# Patient Record
Sex: MALE | Race: OTHER | HISPANIC OR LATINO
[De-identification: names, ages, dates, MRNs, and addresses within clinical notes are randomized per-mention and may not be internally consistent; named-entity substitution may affect disease eponyms.]

---

## 2018-02-13 PROBLEM — Z00.00 ENCOUNTER FOR PREVENTIVE HEALTH EXAMINATION: Status: ACTIVE | Noted: 2018-02-13

## 2018-02-26 ENCOUNTER — RECORD ABSTRACTING (OUTPATIENT)
Age: 63
End: 2018-02-26

## 2018-02-26 DIAGNOSIS — Z82.49 FAMILY HISTORY OF ISCHEMIC HEART DISEASE AND OTHER DISEASES OF THE CIRCULATORY SYSTEM: ICD-10-CM

## 2018-02-26 DIAGNOSIS — E78.2 MIXED HYPERLIPIDEMIA: ICD-10-CM

## 2018-02-26 DIAGNOSIS — J00 ACUTE NASOPHARYNGITIS [COMMON COLD]: ICD-10-CM

## 2018-02-26 DIAGNOSIS — R05 COUGH: ICD-10-CM

## 2018-02-26 DIAGNOSIS — Z87.81 PERSONAL HISTORY OF (HEALED) TRAUMATIC FRACTURE: ICD-10-CM

## 2018-02-26 DIAGNOSIS — S02.42XD: ICD-10-CM

## 2018-02-26 DIAGNOSIS — Z78.9 OTHER SPECIFIED HEALTH STATUS: ICD-10-CM

## 2018-02-26 DIAGNOSIS — Z87.891 PERSONAL HISTORY OF NICOTINE DEPENDENCE: ICD-10-CM

## 2018-02-26 DIAGNOSIS — I10 ESSENTIAL (PRIMARY) HYPERTENSION: ICD-10-CM

## 2018-02-26 DIAGNOSIS — J18.0 BRONCHOPNEUMONIA, UNSPECIFIED ORGANISM: ICD-10-CM

## 2018-02-26 DIAGNOSIS — S42.301A UNSPECIFIED FRACTURE OF SHAFT OF HUMERUS, RIGHT ARM, INITIAL ENCOUNTER FOR CLOSED FRACTURE: ICD-10-CM

## 2018-02-26 RX ORDER — BISMUTH SUBSALICYLATE 525 MG/30ML
524 SUSPENSION ORAL
Refills: 0 | Status: ACTIVE | COMMUNITY

## 2018-02-26 RX ORDER — METOPROLOL SUCCINATE 25 MG/1
25 TABLET, EXTENDED RELEASE ORAL DAILY
Refills: 0 | Status: ACTIVE | COMMUNITY

## 2018-02-26 RX ORDER — BENZONATATE 100 MG/1
100 CAPSULE ORAL 3 TIMES DAILY
Refills: 0 | Status: ACTIVE | COMMUNITY

## 2018-02-26 RX ORDER — PROMETHAZINE HYDROCHLORIDE 6.25 MG/5ML
6.25 SOLUTION ORAL
Refills: 0 | Status: ACTIVE | COMMUNITY

## 2018-02-26 RX ORDER — FLUTICASONE PROPIONATE 50 UG/1
50 SPRAY, METERED NASAL DAILY
Refills: 0 | Status: ACTIVE | COMMUNITY

## 2018-02-26 RX ORDER — ERGOCALCIFEROL 1.25 MG/1
1.25 MG CAPSULE ORAL
Refills: 0 | Status: ACTIVE | COMMUNITY

## 2018-03-24 ENCOUNTER — APPOINTMENT (OUTPATIENT)
Dept: INTERNAL MEDICINE | Facility: CLINIC | Age: 63
End: 2018-03-24

## 2018-07-25 ENCOUNTER — RX RENEWAL (OUTPATIENT)
Age: 63
End: 2018-07-25

## 2018-07-25 RX ORDER — ENALAPRIL MALEATE 20 MG/1
20 TABLET ORAL DAILY
Qty: 7 | Refills: 0 | Status: ACTIVE | COMMUNITY
Start: 1900-01-01 | End: 1900-01-01

## 2018-07-25 RX ORDER — ATORVASTATIN CALCIUM 20 MG/1
20 TABLET, FILM COATED ORAL DAILY
Qty: 7 | Refills: 0 | Status: ACTIVE | COMMUNITY

## 2018-08-01 ENCOUNTER — APPOINTMENT (OUTPATIENT)
Dept: INTERNAL MEDICINE | Facility: CLINIC | Age: 63
End: 2018-08-01

## 2020-03-31 ENCOUNTER — EMERGENCY (EMERGENCY)
Facility: HOSPITAL | Age: 65
LOS: 1 days | Discharge: HOME | End: 2020-03-31
Attending: EMERGENCY MEDICINE
Payer: MEDICAID

## 2020-03-31 VITALS
RESPIRATION RATE: 18 BRPM | SYSTOLIC BLOOD PRESSURE: 143 MMHG | OXYGEN SATURATION: 99 % | TEMPERATURE: 101 F | DIASTOLIC BLOOD PRESSURE: 75 MMHG | HEART RATE: 113 BPM

## 2020-03-31 VITALS
WEIGHT: 199.96 LBS | DIASTOLIC BLOOD PRESSURE: 94 MMHG | RESPIRATION RATE: 20 BRPM | TEMPERATURE: 101 F | HEART RATE: 118 BPM | HEIGHT: 63 IN | OXYGEN SATURATION: 96 % | SYSTOLIC BLOOD PRESSURE: 139 MMHG

## 2020-03-31 PROCEDURE — 99283 EMERGENCY DEPT VISIT LOW MDM: CPT

## 2020-03-31 PROCEDURE — 99284 EMERGENCY DEPT VISIT MOD MDM: CPT

## 2020-03-31 RX ORDER — ACETAMINOPHEN 500 MG
650 TABLET ORAL ONCE
Refills: 0 | Status: COMPLETED | OUTPATIENT
Start: 2020-03-31 | End: 2020-03-31

## 2020-03-31 RX ORDER — IBUPROFEN 200 MG
400 TABLET ORAL ONCE
Refills: 0 | Status: COMPLETED | OUTPATIENT
Start: 2020-03-31 | End: 2020-03-31

## 2020-03-31 RX ADMIN — Medication 10 MILLILITER(S): at 15:00

## 2020-03-31 RX ADMIN — Medication 650 MILLIGRAM(S): at 15:10

## 2020-03-31 RX ADMIN — Medication 400 MILLIGRAM(S): at 15:10

## 2020-03-31 NOTE — ED ADULT NURSE NOTE - NSIMPLEMENTINTERV_GEN_ALL_ED
Implemented All Universal Safety Interventions:  [place] to call system. Call bell, personal items and telephone within reach. Instruct patient to call for assistance. Room bathroom lighting operational. Non-slip footwear when patient is off stretcher. Physically safe environment: no spills, clutter or unnecessary equipment. Stretcher in lowest position, wheels locked, appropriate side rails in place.

## 2020-03-31 NOTE — ED PROVIDER NOTE - CLINICAL SUMMARY MEDICAL DECISION MAKING FREE TEXT BOX
Will discharge with Coronavirus instructions. Patient to return if experiencing SOB. 65 yo M with diarrhea X 4 days. Known Covid+ contact at home. Nonfocal exam. To continue supportive tx. Nontoxic and medically stable for discharge. Return precautions provided and patient understands to return to the ED for worsening signs and symptoms.

## 2020-03-31 NOTE — ED PROVIDER NOTE - NSFOLLOWUPCLINICS_GEN_ALL_ED_FT
[facility]  [facility]  [address] - [address] [place] [address]  Phone: [phone]  Fax: [phone]  Follow Up Time:

## 2020-03-31 NOTE — ED PROVIDER NOTE - NSFOLLOWUPINSTRUCTIONS_ED_ALL_ED_FT
Hoy puede o no nikki sido examinado para detectar el virus COVID-19. Tendrá que aislarse zonia los próximos ___7__ días y luego puede salir del aislamiento siempre y cuando tenga 3 días sin fiebre (sin patrick ningún medicamento para la fiebre).    Para el dolor o la fiebre, puede patrick: Tylenol 1000 mg por vía oral cada 6 horas, según sea necesario. (Holden 4000 mg en 24 horas).    Mantente darien hidratado bebiendo mucha agua todos los días.    ** Regrese a la estela de urgencias si comienza a tener dificultad para respirar, si davida síntomas empeoran o si le preocupa. De lo contrario, quédese en casa y aísle.    ** Si tiene amigos o familiares que tienen síntomas leves que pueden deberse al virus, dígales que se queden en casa    Quédese en casa: las personas que están levemente enfermas con COVID-19 pueden recuperarse en casa. No se vaya, excepto para obtener atención médica. No visitar áreas públicas.  Manténgase en contacto con pascual médico. Llame antes de recibir atención médica. Asegúrese de recibir atención si se siente peor o si edy que es nicky emergencia.  Evite el transporte público: evite usar el transporte público, el transporte compartido o los taxis.  Manténgase alejado de los demás: tanto kathy sea posible, debe permanecer en nicky "habitación para enfermos" específica y lejos de otras personas en pascual hogar. Use un baño separado, si está disponible.  Limite el contacto con mascotas y animales: debe restringir el contacto con mascotas y otros animales, lloyd kathy lo haría con otras personas.  Aunque no conde habido informes de mascotas u otros animales que se enfermen con COVID-19, aún se recomienda que las personas con el virus limiten el contacto con los animales hasta que se conozca más información.  Si está enfermo: debe usar nicky mascarilla cuando esté cerca de otras personas y antes de ingresar al consultorio de un proveedor de atención médica.  Si está cuidando a otros: si la persona enferma no puede usar nicky máscara facial (por ejemplo, porque causa problemas para respirar), las personas que viven en el hogar deben permanecer en nicky habitación diferente. Cuando los cuidadores ingresan a la habitación de la persona enferma, deben usar nicky máscara facial. No se recomiendan visitantes, aparte de los cuidadores.  Cubierta: Cubra pascual boca y nariz con un pañuelo cuando tosa o estornude.  Eliminar: tirar los pañuelos usados ??en un bote de basura forrado.  Lávese las paola: Lávese las paola inmediatamente con agua y jabón zonia al menos 20 segundos. Si no hay agua y jabón disponibles, lávese las paola con un desinfectante para paola a base de alcohol que contenga al menos 60% de alcohol.  Desinfectante para paola: si no hay agua y jabón disponibles, use un desinfectante para paola a base de alcohol con al menos 60% de alcohol, cubriendo todas las superficies de davida paola y frotándolas hasta que se sientan secas.  Jabón y agua: el jabón y el agua son la mejor opción, especialmente si las paola están visiblemente sucias.  Evite tocar: evite tocarse los ojos, la nariz y la boca con las paola sin jimbo.  No comparta: No comparta platos, vasos, vasos, utensilios para comer, toallas o ropa de cama con otras personas en pascual hogar.  Lávese darien después del uso: después de usar estos artículos, lávelos darien con agua y jabón o póngalos en el lavavajillas.  Limpie y desinfecte: Rutinariamente limpie las superficies de alto contacto en pascual "habitación para enfermos" y baño. Deje que otra persona limpie y desinfecte las superficies en las áreas comunes, jose no en pascual habitación y baño.  Si un cuidador u otra persona necesita limpiar y desinfectar la habitación o el baño de nicky persona enferma, debe hacerlo según sea necesario. El cuidador / otra persona debe usar nicky máscara y esperar el mayor tiempo posible después de que la persona enferma haya usado el baño.  Las superficies de alto contacto incluyen teléfonos, controles remotos, mostradores, mesas, pomos de bk, accesorios de baño, inodoros, teclados, tabletas y mesitas de noche.  Limpie y desinfecte las áreas que pueden tener imer, heces o fluidos corporales.  Busque atención médica, jose llame scooby: busque atención médica de inmediato si pascual enfermedad está empeorando (por ejemplo, si tiene dificultad para respirar).  Llame a pascual médico antes de ingresar: Antes de ir al consultorio del médico o la estela de emergencias, llame con anticipación y dígales davida síntomas. Te dirán qué hacer.  Use nicky máscara facial: si es posible, póngase nicky máscara facial antes de ingresar al edificio. Si no puede ponerse nicky máscara facial, trate de mantener nicky distancia costa de otras personas (al menos a 6 pies de distancia). [place] ayudará a proteger a las personas en la oficina o en la estela de espera.  Siga las instrucciones de cuidado de pascual proveedor de atención médica y el departamento de isaac local: las autoridades de isaac locales le darán instrucciones sobre cómo controlar davida síntomas y reportar información.  Llame al 911 si tiene nicky emergencia médica: si tiene nicky emergencia médica y necesita llamar al 911, notifique al operador que tiene o edy que podría tener COVID-19. Si es posible, póngase nicky mascarilla antes de que llegue la ayuda médica.

## 2020-03-31 NOTE — ED PROVIDER NOTE - OBJECTIVE STATEMENT
65 y/o M patient w/ no significant PMHx presents to the ED with diarrhea and abdominal pain. Patient notes positive COVID-19 contacts. Patient denies fever and any other complaints. NKDA. 65 y/o M patient w/ no significant PMHx presents to the ED with diarrhea and abdominal pain X4. Patient notes positive COVID-19 contacts. Denies fevers, nausea, emesis, chest pain, shortness of breath, dysuria, hematuria, constipation, or any other acute complaints. Son in law tested positive for covid19 and lives with them.

## 2020-03-31 NOTE — ED PROVIDER NOTE - PATIENT PORTAL LINK FT
You can access the FollowMyHealth Patient Portal offered by [facility] by registering at the following website: [email or web address]. By joining [employer]’s FollowMyHealth portal, you will also be able to view your health information using other applications (apps) compatible with our system.

## 2023-05-16 ENCOUNTER — NON-APPOINTMENT (OUTPATIENT)
Age: 68
End: 2023-05-16

## 2025-01-28 NOTE — ED PROVIDER NOTE - TEMPLATE, MLM
Per snapshot, left detailed message with results.  Advise patient to call clinic back with any questions.     Communicable/Infectious